# Patient Record
Sex: FEMALE | Race: OTHER | HISPANIC OR LATINO | URBAN - METROPOLITAN AREA
[De-identification: names, ages, dates, MRNs, and addresses within clinical notes are randomized per-mention and may not be internally consistent; named-entity substitution may affect disease eponyms.]

---

## 2021-12-21 ENCOUNTER — EMERGENCY (EMERGENCY)
Facility: HOSPITAL | Age: 61
LOS: 1 days | Discharge: HOME | End: 2021-12-21
Attending: STUDENT IN AN ORGANIZED HEALTH CARE EDUCATION/TRAINING PROGRAM
Payer: SELF-PAY

## 2021-12-21 VITALS
HEART RATE: 55 BPM | DIASTOLIC BLOOD PRESSURE: 67 MMHG | TEMPERATURE: 98 F | WEIGHT: 138.89 LBS | RESPIRATION RATE: 19 BRPM | OXYGEN SATURATION: 98 % | SYSTOLIC BLOOD PRESSURE: 106 MMHG | HEIGHT: 60 IN

## 2021-12-21 PROCEDURE — 99284 EMERGENCY DEPT VISIT MOD MDM: CPT | Mod: 25

## 2021-12-21 PROCEDURE — 96372 THER/PROPH/DIAG INJ SC/IM: CPT

## 2021-12-21 PROCEDURE — 93971 EXTREMITY STUDY: CPT | Mod: 26,LT

## 2021-12-21 PROCEDURE — 99284 EMERGENCY DEPT VISIT MOD MDM: CPT

## 2021-12-21 PROCEDURE — 93971 EXTREMITY STUDY: CPT

## 2021-12-21 RX ORDER — KETOROLAC TROMETHAMINE 30 MG/ML
15 SYRINGE (ML) INJECTION ONCE
Refills: 0 | Status: DISCONTINUED | OUTPATIENT
Start: 2021-12-21 | End: 2021-12-21

## 2021-12-21 RX ADMIN — Medication 15 MILLIGRAM(S): at 18:02

## 2021-12-21 NOTE — ED ADULT NURSE NOTE - OBJECTIVE STATEMENT
pt co pain upper left thigh, pt medicated for pain. no gross edema noted. walked unassisted. pt medicated and sent to ultrasound. pt educated on procedures. use of house  preformed

## 2021-12-21 NOTE — ED PROVIDER NOTE - PATIENT PORTAL LINK FT
You can access the FollowMyHealth Patient Portal offered by [facility] by registering at the following website: [email or web address]. By joining [employer]’s FollowMyHealth portal, you will also be able to view your health information using other applications (apps) compatible with our system.

## 2021-12-21 NOTE — ED PROVIDER NOTE - OBJECTIVE STATEMENT
61 yof pmh recent bladder prolapse surgery here with left lower leg pain since surgery. followed up with surgeon who feels it is unrelated to surgery and recommended she come in for US r/o dvt. Denies any trauma. Reports surgery was ambulatory and surgeon used transvaginal approach. No incision in abdomen. Denies fever, n/v, cp, sob, abd pain

## 2021-12-21 NOTE — ED PROVIDER NOTE - NSFOLLOWUPINSTRUCTIONS_ED_ALL_ED_FT
Dolor de cadera    Hip Pain      La cadera es la articulación entre la parte superior de las piernas y la parte inferior de la pelvis. Los huesos, el cartílago, los tendones y los músculos de la articulación de la cadera sostienen el peso del cuerpo y permiten el desplazamiento.    El dolor de cadera puede tener distintos grados, desde un dolor leve hasta un dolor intenso en imtiaz o ambos lados de la cadera. El dolor puede sentirse en la parte interna de la articulación de la cadera, cerca de la librado, o en la parte externa, cerca de las nalgas y la parte superior de los muslos. También puede estar acompañado de hinchazón o rigidez en la sixto de la cadera.      Ciática    Sciatica       La ciática es el dolor, debilidad, hormigueo o pérdida de la sensibilidad (adormecimiento) a lo ginny del nervio ciático. El nervio ciático comienza en la parte inferior de la espalda y desciende por la parte posterior de cada pierna. Suele desaparecer por sí carmen o con tratamiento. A veces, la ciática puede volver a aparecer (ser recurrente).      ¿Cuáles son las causas?  Esta afección se produce cuando el nervio ciático se comprime o se ejerce presión sobre él. [place] puede ser el resultado de:  •Un disco que sobresale demasiado entre los huesos de la columna vertebral (hernia de disco).      •Los cambios que se producen en los discos vertebrales al envejecer.      •Mikey afección en un músculo de las nalgas.      •Un crecimiento óseo adicional cerca del nervio ciático.      •Mikey rotura (fractura) de la sixto que está entre los huesos de la cadera (pelvis).      •Embarazo.       •Tumor. [place] es poco frecuente.        ¿Qué incrementa el riesgo?  Es más probable que tengan esta afección las personas que:  •Practican deportes que ponen presión o tensión sobre la columna vertebral.      •Tienen poca fuerza y facilidad de movimiento (flexibilidad).      •Amin tenido mikey lesión en la espalda en el pasado.      •Amin tenido mikey cirugía en la espalda.      •Permanecen sentadas giana largos períodos.      •Realizan actividades que implican agacharse o levantar objetos mikey y otra vez.      •Tienen mucho sobrepeso (es salvador).        ¿Cuáles son los signos o los síntomas?  Los síntomas pueden variar de leves a muy graves. Pueden incluir los siguientes:•Cualquiera de los siguientes problemas en la parte inferior de la espalda, piernas, cadera o nalgas:  •Hormigueo leve, pérdida de la sensibilidad o dolor sordo.      •Sensación de ardor.      •Dolor emilee.         •Pérdida de la sensibilidad en la parte posterior de la pantorrilla o la planta del pie.      •Debilidad en las piernas.       •Dolor muy intenso en la espalda que dificulta el movimiento.      Estos síntomas pueden empeorar al toser, estornudar o reír. También pueden empeorar al sentarse o estar de pie giana largos períodos.      ¿Cómo se trata?  A menudo, esta afección mejora sin tratamiento. Sin embargo, el tratamiento puede incluir:  •Cambiar de actividad física o reducirla cuando siente dolor.      •Hacer ejercicios y estiramientos.      •Aplicar hielo o calor sobre la sixto afectada.    •Medicamentos para lo siguiente:   •Aliviar el dolor y la inflamación.       •Relajar los músculos.         •Inyecciones de medicamentos que ayudan a aliviar el dolor, la irritación y la hinchazón.      •Cirugía.        Siga estas instrucciones en flynn casa:    Medicamentos     •Kopperl los medicamentos de venta abel y los recetados solamente fely se lo haya indicado el médico.    •Consulte a flynn médico si el medicamento que le recetaron:  •Hace que sea necesario que evite conducir o usar maquinaria pesada.    •Puede causarle dificultad para defecar (estreñimiento). Es posible que deba ana lilia estas medidas para prevenir o tratar los problemas para defecar:  •Beber suficiente líquido para mantener el pis (la orina) de color amarillo pálido.      •Ana Lilia medicamentos recetados o de venta abel.      •Keene alimentos ricos en fibra. Entre ellos, frijoles, cereales integrales y frutas y verduras frescas.      •Limitar los alimentos con alto contenido de grasa y azúcar. Estos incluyen alimentos fritos o dulces.            Control del dolor                 •Si se lo indican, aplique hielo en la sixto afectada.  •Ponga el hielo en mikey bolsa plástica.      •Coloque mikey toalla entre la piel y la bolsa.      •Coloque el hielo giana 20 minutos, 2 a 3 veces por día.      •Si se lo indican, aplique calor en la sixto afectada. Use la loco de calor que el médico le indique, por ejemplo, mikey compresa de calor húmedo o mikey almohadilla térmica.  •Coloque mikey toalla entre la piel y la loco de calor.      •Aplique calor giana 20 a 30 minutos.      •Retire la loco de calor si la piel se pone de color parisi brillante. [place] es muy importante si no puede sentir dolor, calor o frío. Puede correr un riesgo mayor de sufrir quemaduras.          Actividad      •Retome marino actividades habituales fely se lo haya indicado el médico. Pregúntele al médico qué actividades son seguras para usted.      •Evite las actividades que empeoran los síntomas.    •Descanse por breves períodos giana el día.  •Cuando descanse giana períodos más largos, mulugeta alguna actividad física o un estiramiento entre los períodos de descanso.      •Evite estar sentado giana largos períodos sin moverse. Levántese y muévase al menos mikey vez cada hora.        •Mulugeta ejercicios y estírese con regularidad, fely se lo indicó el médico.    • No levante nada que pese más de 10 libras (4.5 kg) mientras tenga síntomas de ciática.  •Aunque no tenga síntomas, evite levantar objetos pesados.      •Evite levantar objetos pesados de forma repetida.      •Al levantar objetos, hágalo siempre de mikey forma que sea kapoor para flynn cuerpo. Para esto, debe hacer lo siguiente:  •Flexione las rodillas.      •Mantenga el objeto cerca del cuerpo.      •No gire el cuerpo.        Instrucciones generales     •Mantenga un peso saludable.      •Use calzado cómodo, que le dé soporte al pie. Evite usar tacones.      •Evite dormir sobre un colchón que sea demasiado blando o demasiado joel. Es posible que sienta menos dolor si duerme en un colchón con apoyo suficientemente firme para la espalda.      •Concurra a todas las visitas de seguimiento fely se lo haya indicado el médico. [place] es importante.        Comuníquese con un médico si:  •Tiene un dolor con estas características:  •Lo despierta cuando está dormido.      •Empeora al estar recostado.      •Es peor que el dolor que tenía en el pasado.      •Dura más de 4 semanas.        •Pierde peso sin proponérselo.        Solicite ayuda inmediatamente si:    •No puede controlar la orina (micción) ni la evacuación de la materia fecal (defecación).    •Tiene debilidad en alguna de estas zonas, y la debilidad empeora:  •La parte inferior de la espalda.      •La sixto que se encuentra entre los huesos de las caderas.      •Las nalgas.      •Las piernas.        •Siente irritación o inflamación en la espalda.      •Tiene sensación de ardor al orinar.        Resumen    •La ciática es el dolor, debilidad, hormigueo o pérdida de la sensibilidad (adormecimiento) a lo ginny del nervio ciático.      •Esta afección se produce cuando el nervio ciático se comprime o se ejerce presión sobre él.      •La ciática puede causar dolor, hormigueo o pérdida de la sensibilidad (adormecimiento) en la parte inferior de la espalda, las piernas, las caderas y las nalgas.      •El tratamiento a menudo incluye reposo, ejercicio, medicamentos y aplicar hielo o calor en la sixto afectada.      Esta información no tiene fely fin reemplazar el consejo del médico. Asegúrese de hacerle al médico cualquier pregunta que tenga.        Siga estas instrucciones en flynn casa:      Control del dolor, la rigidez y la hinchazón                 •Si se lo indican, aplique hielo sobre la sixto dolorida. Para hacer esto:  •Ponga el hielo en mikey bolsa plástica.      •Coloque mikey toalla entre la piel y la bolsa.      •Aplique el hielo giana 20 minutos, 2 a 3 veces por día.      •Si se lo indican, aplique calor en la sixto afectada con la frecuencia que le haya indicado el médico. Use la loco de calor que el médico le recomiende, fely mikey compresa de calor húmedo o mikey almohadilla térmica.  •Coloque mikey toalla entre la piel y la loco de calor.      •Aplique calor giana 20 a 30 minutos.      •Retire la loco de calor si la piel se pone de color parisi brillante. [place] es especialmente importante si no puede sentir dolor, calor o frío. Puede correr un riesgo mayor de sufrir quemaduras.        Actividad     •Mulugeta ejercicio fely se lo haya indicado el médico.      •Evite las actividades que le causan dolor.        Instrucciones generales      •Use los medicamentos de venta abel y los recetados solamente fely te lo haya indicado el médico.    •Lleve un registro de los síntomas. Escriba los siguientes datos:  •Con qué frecuencia le duele la cadera.      •La ubicación del dolor.      •Cómo se siente el dolor.      •Qué es lo que hace que el dolor empeore.        •Duerma con mikey almohada entre las piernas del lado que le sea más cómodo.      •Concurra a todas las visitas de seguimiento fely se lo haya indicado el médico. [place] es importante.        Comuníquese con un médico si:    •No puede apoyar el peso del cuerpo sobre la pierna.      •El dolor o la hinchazón continúan o empeoran después de mikey semana.      •Tiene más dificultades para caminar.      •Tiene fiebre.        Solicite ayuda inmediatamente si:    •Se .      •El dolor y la hinchazón de la cadera aumentan de repente.      •La cadera está enrojecida o hinchada, o muy dolorida con la palpación.        Resumen    •El dolor de cadera puede tener distintos grados, desde un dolor leve hasta un dolor intenso en imtiaz o ambos lados de la cadera.      •El dolor puede sentirse en la parte interna de la articulación de la cadera, cerca de la librado, o en la parte externa, cerca de las nalgas y la parte superior de los muslos.      •Evite las actividades que le causan dolor.      •Anote la frecuencia con la que tiene dolor en la cadera, la ubicación del dolor, qué es lo que hace que el dolor empeore y qué siente.      Esta información no tiene fely fin reemplazar el consejo del médico. Asegúrese de hacerle al médico cualquier pregunta que tenga.

## 2021-12-21 NOTE — ED PROVIDER NOTE - NS ED ROS FT
ROS:  GEN: (-) fevers/chills  NECK: (-) stiffness, (-) swelling  RESP: (-) shortness of breath,  CV: (-) chest pain, (-) palpitations  GI: (-) nausea, (-) vomiting, (-) pain  : (-) hematuria, (-) dysuria  EXT: (-) edema  NEURO: (-) weakness, (-) headache  MSK: (+) muscle pain

## 2021-12-21 NOTE — ED ADULT TRIAGE NOTE - CHIEF COMPLAINT QUOTE
Pt. c/o Left hip pain x 1 week.  Pt. denies trauma or injury.  Pt. taking tylenol without relief of symptoms; last dose 6am

## 2021-12-21 NOTE — ED PROVIDER NOTE - PHYSICAL EXAMINATION
Vital Signs per nursing documentation  Gen: well appearing, no acute distress  HEENT: NCAT, MMM  Cardiac: regular rate rhythm, normal S1S2  Chest: clear to auscultation bilateral, no wheezes or crackles  Abdomen: soft, non tender non distended  Extremity: no gross deformity, good perfusion, tender in back of thigh, no bruising   Skin: no rash  Neuro: nonfocal neuro exam, gait steady

## 2021-12-21 NOTE — ED PROVIDER NOTE - CARE PROVIDER_API CALL
Piter Calle (DO)  Orthopaedic Surgery  [address] [place] [address], [address]  [place] [address]  Phone: [phone]  Fax: [phone]  Follow Up Time: 4-6 Days

## 2022-06-30 ENCOUNTER — EMERGENCY (EMERGENCY)
Facility: HOSPITAL | Age: 62
LOS: 1 days | Discharge: HOME | End: 2022-06-30
Attending: EMERGENCY MEDICINE
Payer: SELF-PAY

## 2022-06-30 VITALS
WEIGHT: 139.11 LBS | TEMPERATURE: 99 F | RESPIRATION RATE: 18 BRPM | HEART RATE: 84 BPM | OXYGEN SATURATION: 97 % | DIASTOLIC BLOOD PRESSURE: 82 MMHG | SYSTOLIC BLOOD PRESSURE: 150 MMHG | HEIGHT: 60 IN

## 2022-06-30 PROCEDURE — 90715 TDAP VACCINE 7 YRS/> IM: CPT

## 2022-06-30 PROCEDURE — 99284 EMERGENCY DEPT VISIT MOD MDM: CPT | Mod: 25

## 2022-06-30 PROCEDURE — 12001 RPR S/N/AX/GEN/TRNK 2.5CM/<: CPT | Mod: LT

## 2022-06-30 PROCEDURE — 12001 RPR S/N/AX/GEN/TRNK 2.5CM/<: CPT | Mod: 59

## 2022-06-30 PROCEDURE — 73110 X-RAY EXAM OF WRIST: CPT | Mod: 26,LT

## 2022-06-30 PROCEDURE — 90471 IMMUNIZATION ADMIN: CPT

## 2022-06-30 PROCEDURE — 99284 EMERGENCY DEPT VISIT MOD MDM: CPT | Mod: 57

## 2022-06-30 PROCEDURE — 73130 X-RAY EXAM OF HAND: CPT

## 2022-06-30 PROCEDURE — 73130 X-RAY EXAM OF HAND: CPT | Mod: 26,LT

## 2022-06-30 PROCEDURE — 73110 X-RAY EXAM OF WRIST: CPT

## 2022-06-30 PROCEDURE — 26600 TREAT METACARPAL FRACTURE: CPT | Mod: 54

## 2022-06-30 RX ORDER — TETANUS TOXOID, REDUCED DIPHTHERIA TOXOID AND ACELLULAR PERTUSSIS VACCINE, ADSORBED 5; 2.5; 8; 8; 2.5 [IU]/.5ML; [IU]/.5ML; UG/.5ML; UG/.5ML; UG/.5ML
0.5 SUSPENSION INTRAMUSCULAR ONCE
Refills: 0 | Status: COMPLETED | OUTPATIENT
Start: 2022-06-30 | End: 2022-06-30

## 2022-06-30 RX ORDER — IBUPROFEN 200 MG
1 TABLET ORAL
Qty: 20 | Refills: 0
Start: 2022-06-30 | End: 2022-07-04

## 2022-06-30 RX ORDER — OXYCODONE AND ACETAMINOPHEN 5; 325 MG/1; MG/1
2 TABLET ORAL ONCE
Refills: 0 | Status: DISCONTINUED | OUTPATIENT
Start: 2022-06-30 | End: 2022-06-30

## 2022-06-30 RX ORDER — IBUPROFEN 200 MG
600 TABLET ORAL ONCE
Refills: 0 | Status: COMPLETED | OUTPATIENT
Start: 2022-06-30 | End: 2022-06-30

## 2022-06-30 RX ORDER — CEPHALEXIN 500 MG
1 CAPSULE ORAL
Qty: 28 | Refills: 0
Start: 2022-06-30 | End: 2022-07-06

## 2022-06-30 RX ADMIN — Medication 600 MILLIGRAM(S): at 21:40

## 2022-06-30 RX ADMIN — OXYCODONE AND ACETAMINOPHEN 2 TABLET(S): 5; 325 TABLET ORAL at 21:40

## 2022-06-30 RX ADMIN — TETANUS TOXOID, REDUCED DIPHTHERIA TOXOID AND ACELLULAR PERTUSSIS VACCINE, ADSORBED 0.5 MILLILITER(S): 5; 2.5; 8; 8; 2.5 SUSPENSION INTRAMUSCULAR at 21:40

## 2022-06-30 NOTE — ED PROCEDURE NOTE - CPROC ED TIME OUT STATEMENT1
“Patient's name, , procedure and correct site were confirmed during the [place] Timeout.”
“Patient's name, , procedure and correct site were confirmed during the [place] Timeout.”

## 2022-06-30 NOTE — ED PROVIDER NOTE - ATTENDING APP SHARED VISIT CONTRIBUTION OF CARE
Pt with injury in belted machine, c/o writ pain and lac.  Pt with 5th metacarpal fracture---plan lac repair and splinting

## 2022-06-30 NOTE — ED PROVIDER NOTE - CARE PLAN
Principal Discharge DX:	Fracture, metacarpal  Secondary Diagnosis:	Superficial laceration of skin   1

## 2022-06-30 NOTE — ED PROVIDER NOTE - NSFOLLOWUPCLINICS_GEN_ALL_ED_FT
[facility]  Hand Surgery  [address] [facility], [address]  [place] [address]  Phone: [phone]  Fax:

## 2022-06-30 NOTE — ED PROVIDER NOTE - NS ED ATTENDING STATEMENT MOD
This was a shared visit with the ASHWINI. I reviewed and verified the documentation and independently performed the documented:

## 2022-06-30 NOTE — ED PROVIDER NOTE - CLINICAL SUMMARY MEDICAL DECISION MAKING FREE TEXT BOX
PT with stable VS, no acute distress, non toxic appearing, tolerating PO in the ED, Pt neuro vasc intact, lac to wrist closed, Pt placed in splint for acute Fx, follow up to hand Sx, ABx to pharmacy, educated about when to return to the ED if needed. PT verbalizes that he understands all instructions and results. Pt informed that ED is open and availible 24/7 365 days a yr, encouraged to return to the ED if they have any change in condition, or feel the need for revaluation.    utilized to obtain History, ROS, Physical Exam, explanations of results and plan of care, as well as follow up instructions.

## 2022-06-30 NOTE — ED ADULT TRIAGE NOTE - CHIEF COMPLAINT QUOTE
patient states that she was at work got hand caught in a "baby wipe machine"  has small lac to left wrist and arm pain from pulling arm away, got scared

## 2022-06-30 NOTE — ED PROVIDER NOTE - CARE PROVIDER_API CALL
Bruce Tovar)  Orthopaedic Surgery  [address] [place] [address], [address]  [place] [address]  Phone: [phone]  Fax: [phone]  Follow Up Time:

## 2022-06-30 NOTE — ED PROVIDER NOTE - NSFOLLOWUPINSTRUCTIONS_ED_ALL_ED_FT
Cuidado de un desgarro, en adultos    Laceration Care, Adult      Un desgarro es un nakia que puede atravesar todas las capas de la piel hasta el tejido que se encuentra debajo de la piel. Algunos desgarros cicatrizan por sí solos. Otros se deben cerrar con puntos (suturas), grapas, tiras adhesivas para la piel o goma para cerrar la piel.    El cuidado adecuado de un desgarro reduce el riesgo de infección, ayuda a que el desgarro cierre mejor, y puede prevenir la formación de cicatrices.      Consejos generales    •Mantenga la herida limpia y seca.      • No se rasque ni se toque la herida.      •Lávese las jori con agua y jabón shobha al menos 20 segundos antes y después de tocar la herida o cambiarse la venda (vendaje). Use desinfectante para jori si no dispone de agua y jabón.      • No usedesinfectantes ni antisépticos, fely alcohol para frotar, para limpiar la herida, a menos que se lo indique kim médico.      •Si le colocaron un vendaje, cámbielo al menos mikey vez al día o fely se lo haya indicado el médico. También debe cambiarlo si se moja o se ensucia.        Cómo cuidar del desgarro    Si se utilizaron suturas o grapas:     •Mantenga la herida completamente seca shobha las primeras 24 horas o fely se lo haya indicado el médico. Transcurrido lianne tiempo, puede ducharse o ana lilia margret de inmersión. No se sumerja en agua hasta que le hayan quitado las suturas o grapas.    •Limpie la herida mikey vez por día o fely se lo haya indicado el médico. Para hacer esto:  •Lave la herida con agua y jabón.      •Enjuáguela con agua para quitar todo el jabón.      •Séquela dando palmaditas con mikey toalla limpia. No frote la herida.        •Después de limpiar la herida, aplique mikey delgada capa de ungüento con antibiótico, otros ungüentos tópicos, o un vendaje no adherente fely se lo haya indicado el médico. Sewaren ayudará a prevenir infecciones y a evitar que el vendaje se adhiera a la herida.      •Mulugeta que le retiren las suturas o las grapas fely se lo haya indicado el médico. No retire las suturas ni las grapas usted mismo.      Si se utilizaron tiras adhesivas para la piel:     • No deje que las tiras adhesivas para la piel se mojen. Puede bañarse o ducharse, xiang tenga cuidado de mantener la herida seca.      •Si se moja, séquela dando palmaditas con mikey toalla limpia. No frote la herida.      •Las tiras adhesivas para la piel se caen solas. Si los bordes de las tiras adhesivas empiezan a despegarse y enroscarse, puede recortar los que estén sueltos. No retire las tiras adhesivas por completo a menos que el médico se lo indique.      Si se utilizó goma para cerrar la piel:     •Puede bañarse o ducharse, xiang tenga cuidado de mantener la herida seca. No sumerja la herida en agua.      •Después de ducharse o darse un baño, seque el nakia dando palmaditas con mikey toalla limpia. No frote la herida.      • No practique actividades que lo lamont transpirar mucho hasta que la goma para cerrar la piel se haya caído carmen.      • No aplique líquidos, cremas ni ungüentos medicinales en la herida mientras todavía tenga la goma para cerrar la piel. De lo contrario, puede aflojar la película antes de que la herida cicatrice.      •Si la herida está cubierta con un vendaje, no aplique cinta adhesiva directamente sobre la goma para cerrar la piel. De lo contrario, puede hacer que la goma se despegue antes de que la herida cicatrice.      • No toque la goma. La goma para cerrar la piel generalmente permanece sobre la piel de 5 a 10 días y luego se  carmen.        Siga estas instrucciones en kim casa:    Medicamentos     •Use los medicamentos de venta aebl y los recetados solamente fely se lo haya indicado el médico.      •Si le recetaron un medicamento o ungüento con antibiótico, tómelo o aplíqueselo fely se lo haya indicado el médico. No deje de usarlo aunque la afección mejore.      Control del dolor y la hinchazón   •Si se lo indican, aplique hielo sobre la sixto de la lesión. Para hacer esto:  •Ponga el hielo en mikey bolsa plástica.      •Coloque mikey toalla entre la piel y la bolsa.      •Aplique el hielo shobha 20 minutos, 2 o 3 veces por día.      •Retire el hielo si la piel se pone de color parisi brillante. Sewaren es muy importante. Si no puede sentir dolor, calor o frío, tiene un mayor riesgo de que se dañe la sixto.        •Shobha las primeras 24 a 48 horas después de que le hayan reparado el desgarro, cuando esté sentado o acostado, levante (eleve) la sixto de la lesión por encima del nivel del corazón.        Instrucciones generales   Two wounds closed with skin glue. One is normal. The other is red with pus and infected.   •Evite cualquier actividad que pueda hacer que el desgarro vuelva a abrirse.    •Controle la herida todos los días para detectar signos de infección. Esté atento a lo siguiente:  •Aumento del enrojecimiento, la hinchazón o el dolor.      •Líquido o angela.      •Calor.      •Pus o mal olor.        •Concurra a todas las visitas de seguimiento. Sewaren es importante.        Comuníquese con un médico si:    •Le aplicaron la vacuna antitetánica y tiene hinchazón, dolor intenso, enrojecimiento o hemorragia en el sitio de la inyección.      •La herida cerrada se abre.    •Tiene cualquiera de estos signos de infección:  •Más enrojecimiento, hinchazón o dolor alrededor de la herida.      •Líquido o angela que salen de la herida.      •Calor que proviene de la herida.      •Pus o mal olor proveniente de la herida.      •Fiebre.        •Nota un cuerpo extraño en la herida, fely un trozo de soliman o jigna.      •El dolor no se tammy con los medicamentos.      •Observa que la piel cerca de la herida cambia de color.      •Necesita cambiar el vendaje con frecuencia.      •Presenta mikey nueva erupción cutánea.      •Tiene entumecimiento alrededor de la herida.        Solicite ayuda de inmediato si:    •Tiene mucha hinchazón alrededor de la herida.      •El dolor aumenta repentinamente y es intenso.      •Presenta nódulos dolorosos cerca de la herida o en la piel en cualquier sixto del cuerpo.      •Tiene mikey línea cathy que sale de la herida.      •La herida está en la mano o en el pie, y no puede  correctamente imtiaz de los dedos.      •La herida está en la mano o en el pie y observa que los dedos tienen un tremaine pálido o azulado.        Resumen    •Un desgarro es un nakia que puede atravesar todas las capas de la piel hasta el tejido que se encuentra debajo de la piel.      •Algunos desgarros cicatrizan por sí solos. Otros se deben cerrar con puntos (suturas), grapas, tiras adhesivas para la piel o goma para cerrar la piel.      •El cuidado adecuado de un desgarro reduce el riesgo de infección, ayuda a que el desgarro cierre mejor, y puede prevenir la formación de cicatrices.      Esta información no tiene fely fin reemplazar el consejo del médico. Asegúrese de hacerle al médico cualquier pregunta que tenga.33            Fractura de jacky tratada con inmovilización    Wrist Fracture Treated With Immobilization       La fractura de jacky es la rotura o fisura de imtiaz de los huesos que la componen. La jacky se compone de ocho huesos pequeños que se encuentran en la lorenzo de la mano (huesos carpianos) y dos huesos largos que componen el antebrazo (radio y cúbito).    Si la articulación está estable y los huesos aún están en kim posición normal (sin desplazamiento), la lesión se puede tratar con inmovilización. Sewaren implica el uso de un yeso, mikey férula o un cabestrillo para sostener la jacky en el lugar. La inmovilización asegura que los huesos se mantengan en la posición correcta mientras la jacky se velma.      ¿Cuáles son las causas?    Esta afección puede ser causada por lo siguiente:  •Mikey fuerza directa en la jacky.      •Los traumatismos, fely un choque automovilístico o mikey caída sobre mikey mano extendida.        ¿Qué incrementa el riesgo?    Los siguientes factores pueden hacer que sea más propenso a desarrollar esta afección:  •Hacer deportes de contacto o de alto riesgo, fely esquí, ciclismo o patinaje sobre hielo.      •Ana Lilia medicamentos con corticoesteroides.      •Fumar.      •Ser marissa.      •Ser de therese caucásica.      •Consumir más de jose bebidas alcohólicas por día.      •Tener mikey afección que debilita los huesos (osteoporosis).      •El envejecimiento.      •Tener antecedentes de fractura.        ¿Cuáles son los signos o los síntomas?    Los síntomas de esta afección incluyen:  •Dolor.      •Hinchazón.      •Moretones.      •Imposibilidad para  la jacky normalmente.      La jacky también puede colgar en mikey posición extraña o verse deformada.      ¿Cómo se diagnostica?    Esta afección se puede diagnosticar con un examen físico y radiografías. También se puede realizar mikey exploración por tomografía computarizada (TC) o resonancia magnética (RM).      ¿Cómo se trata?    El tratamiento para esta afección incluye usar un yeso, mikey férula o un cabestrillo hasta que la sixto de la lesión esté suficientemente estable para que usted pueda empezar a hacer ejercicios de flexibilidad. Además, es posible que le receten analgésicos.      Siga estas instrucciones en kim casa:    Si tiene un yeso:     • No introduzca nada dentro del yeso para rascarse la piel. Sewaren puede aumentar el riesgo de contraer mikey infección.      •Controle la piel alrededor del yeso todos los días. Informe al médico acerca de cualquier inquietud.      •Puede aplicar mikey loción en la piel seca alrededor de los bordes del yeso. No aplique loción en la piel por debajo del yeso.      •Mantenga el yeso seco y limpio.      Si tiene mikey férula o un cabestrillo:     •Úselos fely se lo haya indicado el médico. Quíteselos solamente fley se lo haya indicado el médico.      •Afloje la férula o el cabestrillo si los dedos de la mano se le adormecen, siente hormigueos, o se le enfrían y se tornan de color tee.      •Mantenga el cabestrillo o la férula limpios y secos.      Bañarse     • No tome margret de inmersión, no nade ni use el jacuzzi hasta que el médico lo autorice. Pregúntele al médico si puede ducharse. Tony vez solo le permitan darse margret de esponja.    •Si el yeso, la férula o el cabestrillo no son impermeables:  •No deje que se mojen.      •Cúbralos con un envoltorio hermético cuando tome un baño de inmersión o mikey ducha.        •Si tiene un cabestrillo, quíteselo para bañarse sólo si el médico se lo permite.        Control del dolor, la rigidez y la hinchazón    •Si se lo indican, aplique hielo sobre la sixto de la lesión. Para hacer esto:  •Si tiene mikey férula o un cabestrillo desmontables, quíteselos fely se lo haya indicado el médico.      •Ponga el hielo en mikey bolsa plástica.      •Coloque mikey toalla entre la piel y la bolsa de hielo o el yeso y la bolsa de hielo.      •Aplique el hielo shobha 20 minutos, 2 o 3 veces por día.      •Retire el hielo si la piel se pone de color parisi brillante. Sewaren es muy importante. Si no puede sentir dolor, calor o frío, tiene un mayor riesgo de que se dañe la isxto.        •Mueva los dedos con frecuencia para reducir la rigidez y la hinchazón.      •Cuando esté sentado o acostado, levante (eleve) la sixto lesionada por encima del nivel del corazón.      Actividad     •Retome marino actividades normales según lo indicado por el médico. Pregúntele al médico qué actividades son seguras para usted.      • No levante ningún objeto la jacky lesionada ni ponga peso sobre davide hasta que el médico lo autorice.      •Pregúntele al médico cuándo puede volver a conducir si tiene un yeso, mikey férula o un cabestrillo en la jacky.      •Mulugeta ejercicios de flexibilidad solamente fely se lo haya indicado el médico o el fisioterapeuta.      Medicamentos     •Use los medicamentos de venta abel y los recetados solamente fely se lo haya indicado el médico.    •Pregúntele al médico si el medicamento recetado:  •Hace necesario que evite conducir o usar maquinaria.    •Puede causarle estreñimiento. Es posible que tenga que ana lilia estas medidas para prevenir o tratar el estreñimiento:  •Beber suficiente líquido fely para mantener la orina de color amarillo pálido.      •Usar medicamentos recetados o de venta abel.      •Consumir alimentos ricos en fibra, fely frijoles, cereales integrales, y frutas y verduras frescas.      •Limitar el consumo de alimentos ricos en grasa y azúcares procesados, fely los alimentos fritos o dulces.          Indicaciones generales     • No ejerza presión en ninguna parte del yeso o de la férula hasta que se haya endurecido por completo. Sewaren puede tardar varias horas.      • No consuma ningún producto que contenga nicotina o tabaco, fely cigarrillos, cigarrillos electrónicos y tabaco de mascar. Estos pueden retrasar la consolidación del hueso. Si necesita ayuda para dejar de consumir estos productos, consulte al médico.      •Cumpla con todas las visitas de seguimiento. Sewaren es importante.        Comuníquese con un médico si:    •Kim yeso, férula o cabestrillo se daña o se afloja.      •Tiene dolor, hinchazón o moretones nuevos.      •El dolor, la hinchazón o los moretones no mejoran.      •Tiene fiebre.      •Tiene escalofríos.        Solicite ayuda de inmediato si:    •La piel o los dedos del brazo lesionado se tornan azules o grises.      •Siente el brazo adormecido o frío.      •Siente un dolor muy intenso en la jacky lesionada.        Resumen    •La fractura de jacky es la rotura o fisura de imtiaz de los huesos que la componen.      •Si la articulación está estable y los huesos aún están en kim posición normal, la lesión se puede tratar con el uso de un yeso, mikey férula o un cabestrillo.      •Si tiene un yeso, revise la piel de alrededor todos los días. Informe al médico acerca de cualquier inquietud.      •Si tiene mikey férula o un cabestrillo, úselos fely se lo haya indicado el médico. Quíteselos solamente fely se lo haya indicado el médico.      Esta información no tiene fely fin reemplazar el consejo del médico. Asegúrese de hacerle al médico cualquier pregunta que tenga.

## 2022-06-30 NOTE — ED PROVIDER NOTE - ADDITIONAL NOTES AND INSTRUCTIONS:
PT was evaluated At [facility] ED and was found to have a condition that warranted time of to rest and heal from WORK/SCHOOL.   Robert Shields PA-C

## 2022-06-30 NOTE — ED PROVIDER NOTE - OBJECTIVE STATEMENT
PT with no SPMHx presents to the ED with complaint of Lt wrist injury JPTA. Pt states that she was at work when her hand was caught on conveyor belt. Pt states that she had a sudden onset of pain in hand that is constat, feels like sharp pain that is non radiating, made worse with activity. Pt states that she had a laceration, to her hand that she did not tx PTA. Pt dines numbness, tingling HA, dizziness, SOB, diff breathing, loss of sensation.

## 2022-06-30 NOTE — ED PROVIDER NOTE - PATIENT PORTAL LINK FT
You can access the FollowMyHealth Patient Portal offered by [facility] by registering at the following website: [email or web address]. By joining [employer]’s FollowMyHealth portal, you will also be able to view your health information using other applications (apps) compatible with our system.

## 2022-06-30 NOTE — ED PROVIDER NOTE - NS ED ROS FT
ROS: CONTUSIONAL: Denies fever, chills, fatigue, wt loss. HEAD: Denies trauma, HA, Dizziness. EYE: Denies Acute visual changes, diplopia. ENMT: Denies change in hearing, tinnitus, epistaxis, difficulty swallowing, sore throat. CARDIO: Denies CP, palpitations, edema. RESP: Denies Cough, SOB , Diff breathing, hemoptysis. GI: Denies N/V, ABD pain, change in bowel movement. URINARY: Denies difficulty urinating, pelvic pain. MS:  joint pain,  Denies back pain, weakness, decreased ROM, swelling. NEURO: Denies change in gait, seizures, loss of sensation, dizziness, confusion LOC.  PSY: NO SI/HI. SKIN: +laceration Denies Rash, bruising.

## 2023-01-19 ENCOUNTER — EMERGENCY (EMERGENCY)
Facility: HOSPITAL | Age: 63
LOS: 1 days | Discharge: HOME | End: 2023-01-19
Attending: EMERGENCY MEDICINE
Payer: MEDICAID

## 2023-01-19 VITALS
RESPIRATION RATE: 20 BRPM | HEART RATE: 86 BPM | WEIGHT: 139.99 LBS | DIASTOLIC BLOOD PRESSURE: 72 MMHG | OXYGEN SATURATION: 98 % | SYSTOLIC BLOOD PRESSURE: 117 MMHG | TEMPERATURE: 98 F | HEIGHT: 59 IN

## 2023-01-19 PROCEDURE — 73140 X-RAY EXAM OF FINGER(S): CPT | Mod: 26,RT

## 2023-01-19 PROCEDURE — 99283 EMERGENCY DEPT VISIT LOW MDM: CPT | Mod: 25

## 2023-01-19 PROCEDURE — 10060 I&D ABSCESS SIMPLE/SINGLE: CPT

## 2023-01-19 PROCEDURE — 99284 EMERGENCY DEPT VISIT MOD MDM: CPT | Mod: 25

## 2023-01-19 PROCEDURE — 73140 X-RAY EXAM OF FINGER(S): CPT

## 2023-01-19 NOTE — ED PROVIDER NOTE - ATTENDING APP SHARED VISIT CONTRIBUTION OF CARE
The patient discussed with TIMO Dasilva I, Jasiel Snow, performed the initial face to face bedside interview with this patient regarding history of present illness, review of symptoms and relevant past medical, social and family history.  I completed an independent physical examination.  I was the initial provider who evaluated this patient. I have signed out the follow up of any pending tests (i.e. labs, radiological studies) to the ACP.  I have communicated the patient’s plan of care and disposition with the ACP.

## 2023-01-19 NOTE — ED PROVIDER NOTE - PATIENT PORTAL LINK FT
You can access the FollowMyHealth Patient Portal offered by [facility] by registering at the following website: [email or web address]. By joining [employer]’s FollowMyHealth portal, you will also be able to view your health information using other applications (apps) compatible with our system.

## 2023-01-19 NOTE — ED PROVIDER NOTE - OBJECTIVE STATEMENT
62yo F presents to ED c/o of finger swelling and pain x4d. pt reports cleaning and preparing a fish to cook, one of the spines from the fishes scales stuck pt in 2nd finger of R hand. pt reports removing spine immediately. pt states pain has worsened and progressed into swelling. Pt denies fevers, DC from finger. pt has not tried OCT meds or creams/ointments.

## 2023-01-19 NOTE — ED PROVIDER NOTE - CLINICAL SUMMARY MEDICAL DECISION MAKING FREE TEXT BOX
62yo F presents to ED c/o of finger swelling and pain x4d. pt reports cleaning and preparing a fish to cook, one of the spines from the fishes scales stuck pt in 2nd finger of R hand. pt reports removing spine immediately. pt states pain has worsened and progressed into swelling. Pt denies fevers, DC from finger. upon PE there is a 2cm x2cm abscess on DIP of 2nd finger on R hand with surrounding edema and erythema. I&D was performed, fish spine was extracted form abscess. pt received XR to r/o further foreign bodies needing removal. Pt given doxycycline, also Rx to pharmacy. discussed supportive care measures and return precautions. discussed wound care and hygiene. advised pt to f/u with PCP. Pt verbalizes understanding and agreement.

## 2023-01-19 NOTE — ED PROVIDER NOTE - NSFOLLOWUPINSTRUCTIONS_ED_ALL_ED_FT
Absceso en la piel    Skin Abscess    Close-up of an abscess on the skin.   Un absceso en la piel es mikey sixto infectada en la piel o debajo de la piel que contiene mikey acumulación de pus y otros materiales. Al absceso también se le puede llamar forúnculo, furúnculo o divieso. Un absceso puede presentarse en paulino cualquier parte del cuerpo.    Algunos abscesos se abren (rompen) por sí solos. La mayoría sigue empeorando si no se le da tratamiento. La infección puede diseminarse hacia otras partes del cuerpo y finalmente llegar a la angela, lo que puede hacer que usted se sienta mal. Generalmente, el tratamiento consiste en el drenaje del absceso.      ¿Cuáles son las causas?    Un absceso se produce cuando los gérmenes, fely las bacterias, pasan a través de la piel y causan infección. Las causas de esto pueden ser las siguientes:  •Un rasguño o un nakia en la piel.      •Mikey herida por punción a través de la piel, incluidas mikey inyección con aguja o la picadura de un insecto.      •Obstrucción de las glándulas sebáceas o sudoríparas.      •Obstrucción e infección de folículos pilosos.      •Un quiste que se forma debajo de la piel (quiste sebáceo) y se infecta.        ¿Qué incrementa el riesgo?    Es más probable que esta afección se manifieste en las personas que:  •Tienen debilitado el sistema de defensa del organismo (sistema inmunitario).      •Tienen diabetes.      •Tienen la piel seca e irritada.      •Reciben inyecciones con frecuencia o consumen drogas ilegales por vía intravenosa.      •Tienen un cuerpo extraño en mikey herida; por ejemplo, mikey astilla.      •Tienen problemas en el sistema linfático o las venas.        ¿Cuáles son los signos o los síntomas?    Los síntomas de esta afección incluyen:  •Mikey protuberancia dolorosa y firme debajo de la piel.      •Mikey protuberancia con pus en la parte superior. El pus puede romper la piel y supurar hacia el exterior.      Otros síntomas pueden ser los siguientes:  •Enrojecimiento en torno al lugar del absceso.      •Calor.      •Hinchazón de los ganglios linfáticos (glándulas) cerca del absceso.      •Dolor a la palpación.      •Mikey úlcera en la piel.        ¿Cómo se diagnostica?    Esta afección se puede diagnosticar en función de lo siguiente:  •Un examen físico.      •Susan antecedentes médicos.      •Mikey muestra de pus. Esta puede utilizarse para averiguar qué está causando la infección.      •Análisis de angela.      •Estudios de diagnóstico por imágenes, fely ecografía, exploración por tomografía computarizada (TC) o resonancia magnética (RM).        ¿Cómo se trata?    Un absceso pequeño que drena por sí solo puede no necesitar tratamiento. El tratamiento de los abscesos más grandes puede incluir lo siguiente:  •Aplicación de mikey compresa de calor húmedo o de calor en la sixto varias veces por día.      •Un procedimiento para drenar el absceso (incisión y drenaje).      •Antibióticos. Para un absceso grave, puede recibir rosemary antibióticos a través de mikey vía intravenosa y luego cambiar a los antibióticos por boca.        Siga estas instrucciones en flynn casa:      Medicamentos   A prescription pill bottle with an example of a pill.   •Use los medicamentos de venta abel y los recetados solamente fely se lo haya indicado el médico.      •Si le recetaron un antibiótico, tómelo fely se lo haya indicado el médico. No deje de ana lilia el antibiótico aunque comience a sentirse mejor.        Cuidado del absceso   Washing hands with soap and water. •Si usted tiene un absceso que no ha supurado, aplique calor en la sixto afectada. Use la loco de calor que el médico le recomiende, fely mikey compresa de calor húmedo o mikey almohadilla térmica.  •Coloque mikey toalla entre la piel y la loco de calor.      •Aplique calor giana 20 a 30 minutos.      •Retire la loco de calor si la piel se pone de color parisi brillante. [place] es especialmente importante si no puede sentir dolor, calor o frío. Puede correr un riesgo mayor de sufrir quemaduras.      •Siga las instrucciones del médico acerca del cuidado del absceso. Asegúrese de hacer lo siguiente:  •Cubra el absceso con mikey venda (vendaje).      •Cambie el vendaje o la gasa fely se lo haya indicado el médico.      •Lávese las jori con agua y jabón antes de cambiar el vendaje o la gasa. Use desinfectante para jori si no dispone de agua y jabón.      •Controle el absceso todos los días para observar si hay signos de que la infección empeora. Esté atento a los siguientes signos:  •Aumento del enrojecimiento, la hinchazón o el dolor.      •Más líquido o angela.      •Calor.      •Mal olor o más pus.        Instrucciones generales   •Para evitar la propagación de la infección:  •No comparta artículos de cuidado personal, toallas ni jacuzzis con otras personas.      •Evite el contacto piel con piel con otras personas.        •Concurra a todas las visitas de seguimiento fely se lo haya indicado el médico. [place] es importante.        Comuníquese con un médico si tiene:    •Aumento del enrojecimiento, la hinchazón o el dolor alrededor del absceso.      •Aumento de la cantidad de líquido o angela que sale del absceso.      •La piel que rodea el absceso se siente caliente.      •Más pus o un mal olor que proviene del absceso.      •Sheree musculares.      •Escalofríos o sensación general de malestar.        Solicite ayuda de inmediato si:    •Tiene dolor intenso.      •Observa líneas colindres en la piel que se extienden desde el absceso.      •Observa un enrojecimiento que se extiende rápidamente.      •Tiene fiebre o escalofríos.        Resumen    •Un absceso en la piel es mikey sixto infectada en la piel o debajo de la piel que contiene mikey acumulación de pus y otros materiales.      •Un absceso pequeño que drena por sí solo puede no necesitar tratamiento.      •El tratamiento de los abscesos más grandes puede incluir un procedimiento para drenar el absceso y la liz de un antibiótico.      Esta información no tiene fely fin reemplazar el consejo del médico. Asegúrese de hacerle al médico cualquier pregunta que tenga.

## 2023-01-19 NOTE — ED ADULT TRIAGE NOTE - CHIEF COMPLAINT QUOTE
Pt states she had fish on Sunday and got stuck with the fish bone. Now having pain and swelling to the R hand pointer

## 2023-01-20 PROBLEM — Z78.9 OTHER SPECIFIED HEALTH STATUS: Chronic | Status: ACTIVE | Noted: 2022-06-30

## 2024-04-03 ENCOUNTER — EMERGENCY (EMERGENCY)
Facility: HOSPITAL | Age: 64
LOS: 1 days | Discharge: HOME | End: 2024-04-03
Attending: STUDENT IN AN ORGANIZED HEALTH CARE EDUCATION/TRAINING PROGRAM
Payer: MEDICAID

## 2024-04-03 VITALS
WEIGHT: 138.67 LBS | HEIGHT: 61 IN | RESPIRATION RATE: 18 BRPM | SYSTOLIC BLOOD PRESSURE: 122 MMHG | OXYGEN SATURATION: 98 % | DIASTOLIC BLOOD PRESSURE: 80 MMHG | TEMPERATURE: 98 F | HEART RATE: 77 BPM

## 2024-04-03 PROCEDURE — 99283 EMERGENCY DEPT VISIT LOW MDM: CPT

## 2024-04-03 PROCEDURE — T1013: CPT

## 2024-04-03 PROCEDURE — 99284 EMERGENCY DEPT VISIT MOD MDM: CPT

## 2024-04-03 PROCEDURE — 96372 THER/PROPH/DIAG INJ SC/IM: CPT

## 2024-04-03 RX ORDER — METHOCARBAMOL 500 MG/1
1500 TABLET, FILM COATED ORAL ONCE
Refills: 0 | Status: COMPLETED | OUTPATIENT
Start: 2024-04-03 | End: 2024-04-03

## 2024-04-03 RX ORDER — LIDOCAINE 4 G/100G
1 CREAM TOPICAL ONCE
Refills: 0 | Status: COMPLETED | OUTPATIENT
Start: 2024-04-03 | End: 2024-04-03

## 2024-04-03 RX ORDER — METHOCARBAMOL 500 MG/1
2 TABLET, FILM COATED ORAL
Qty: 42 | Refills: 0
Start: 2024-04-03 | End: 2024-04-09

## 2024-04-03 RX ORDER — METHOCARBAMOL 500 MG/1
750 TABLET, FILM COATED ORAL ONCE
Refills: 0 | Status: DISCONTINUED | OUTPATIENT
Start: 2024-04-03 | End: 2024-04-03

## 2024-04-03 RX ORDER — KETOROLAC TROMETHAMINE 30 MG/ML
15 SYRINGE (ML) INJECTION ONCE
Refills: 0 | Status: DISCONTINUED | OUTPATIENT
Start: 2024-04-03 | End: 2024-04-03

## 2024-04-03 RX ORDER — METHOCARBAMOL 500 MG/1
2 TABLET, FILM COATED ORAL
Refills: 0
Start: 2024-04-03

## 2024-04-03 RX ADMIN — LIDOCAINE 1 PATCH: 4 CREAM TOPICAL at 09:33

## 2024-04-03 RX ADMIN — METHOCARBAMOL 1500 MILLIGRAM(S): 500 TABLET, FILM COATED ORAL at 09:33

## 2024-04-03 RX ADMIN — Medication 15 MILLIGRAM(S): at 09:33

## 2024-04-03 NOTE — ED PROVIDER NOTE - OBJECTIVE STATEMENT
64 yof pmh HLD here with 1 year of back pain. Had accident last year with stimulator placed for pain for the right. also has pain on left for a year, improvement with naprosyn . Has not been able to see PMD due to insurance problem. Denies any injury. Denies fever, weakness, numbness, tingling, urinary/bowel incontinence. Denies drugs  Intrepretor Reynaldo

## 2024-04-03 NOTE — ED ADULT NURSE NOTE - OBJECTIVE STATEMENT
Patient presents to ER C/O lower back pain, denies recent injuries, no urinary symptoms, denies fever/chills, no paresthesias, resp even/unlabored, patient ambulating in ED.

## 2024-04-03 NOTE — ED PROVIDER NOTE - NSFOLLOWUPINSTRUCTIONS_ED_ALL_ED_FT
Dolor de espalda    El dolor de espalda es muy común en los adultos. La causa del dolor de espalda parker vez es peligrosa y el dolor suele mejorar con el tiempo. Es posible que no se conozca la causa de flynn dolor de espalda y puede incluir tensión de músculos o ligamentos, degeneración de los discos espinales o artritis. Ocasionalmente, el dolor puede irradiarse hacia las piernas. Los medicamentos de venta abel para reducir el dolor y la inflamación suelen ser los más útiles. Estirarse y mantenerse activo con frecuencia ayuda al proceso de curación.    BUSQUE ATENCIÓN MÉDICA INMEDIATA SI TIENE ALGUNO DE LOS SIGUIENTES SÍNTOMAS: problemas de control de los intestinos o de la vejiga, debilidad o entumecimiento inusual en marino brazos o piernas, náuseas o vómitos, dolor abdominal, fiebre, mareos / aturdimiento.

## 2024-04-03 NOTE — ED PROVIDER NOTE - NSFOLLOWUPCLINICS_GEN_ALL_ED_FT
[facility] [id number] [place] [address]  Phone: [phone]  Fax:   Follow Up Time: 4-6 Days

## 2024-04-03 NOTE — ED PROVIDER NOTE - CLINICAL SUMMARY MEDICAL DECISION MAKING FREE TEXT BOX
64 yof pmh HLD here with 1 year of back pain. Had accident last year with stimulator placed for pain for the right. also has pain on left for a year, improvement with naprosyn . Has not been able to see PMD due to insurance problem. Denies any injury. Denies fever, weakness, numbness, tingling, urinary/bowel incontinence. Denies drugs  Intrepretor Galud   AP - no red flags for cord compression. symptoms been ongoing for about 1 year, will provide supportive care and recommend outpt f/u with PMD/pain

## 2024-04-03 NOTE — ED PROVIDER NOTE - PATIENT PORTAL LINK FT
You can access the FollowMyHealth Patient Portal offered by [facility] by registering at the following website: [email or web address]. By joining [facility]’s FollowMyHealth portal, you will also be able to view your health information using other applications (apps) compatible with our system.

## 2024-04-06 DIAGNOSIS — M54.9 DORSALGIA, UNSPECIFIED: ICD-10-CM

## 2024-04-06 DIAGNOSIS — E78.5 HYPERLIPIDEMIA, UNSPECIFIED: ICD-10-CM
